# Patient Record
Sex: MALE | Race: BLACK OR AFRICAN AMERICAN | NOT HISPANIC OR LATINO | Employment: FULL TIME | URBAN - METROPOLITAN AREA
[De-identification: names, ages, dates, MRNs, and addresses within clinical notes are randomized per-mention and may not be internally consistent; named-entity substitution may affect disease eponyms.]

---

## 2024-02-09 ENCOUNTER — LAB (OUTPATIENT)
Dept: LAB | Facility: LAB | Age: 24
End: 2024-02-09
Payer: COMMERCIAL

## 2024-02-09 ENCOUNTER — OFFICE VISIT (OUTPATIENT)
Dept: PRIMARY CARE | Facility: CLINIC | Age: 24
End: 2024-02-09
Payer: MEDICAID

## 2024-02-09 VITALS
SYSTOLIC BLOOD PRESSURE: 118 MMHG | WEIGHT: 152.6 LBS | HEIGHT: 74 IN | BODY MASS INDEX: 19.58 KG/M2 | HEART RATE: 94 BPM | OXYGEN SATURATION: 98 % | DIASTOLIC BLOOD PRESSURE: 88 MMHG

## 2024-02-09 DIAGNOSIS — Z11.3 SCREEN FOR STD (SEXUALLY TRANSMITTED DISEASE): Primary | ICD-10-CM

## 2024-02-09 DIAGNOSIS — Z11.3 SCREEN FOR STD (SEXUALLY TRANSMITTED DISEASE): ICD-10-CM

## 2024-02-09 PROCEDURE — 87389 HIV-1 AG W/HIV-1&-2 AB AG IA: CPT

## 2024-02-09 PROCEDURE — 36415 COLL VENOUS BLD VENIPUNCTURE: CPT

## 2024-02-09 PROCEDURE — 86694 HERPES SIMPLEX NES ANTBDY: CPT

## 2024-02-09 PROCEDURE — 87800 DETECT AGNT MULT DNA DIREC: CPT

## 2024-02-09 PROCEDURE — 99213 OFFICE O/P EST LOW 20 MIN: CPT | Performed by: NURSE PRACTITIONER

## 2024-02-09 PROCEDURE — 86780 TREPONEMA PALLIDUM: CPT

## 2024-02-09 PROCEDURE — 86803 HEPATITIS C AB TEST: CPT

## 2024-02-09 PROCEDURE — 1036F TOBACCO NON-USER: CPT | Performed by: NURSE PRACTITIONER

## 2024-02-09 NOTE — PROGRESS NOTES
"Subjective   Patient ID: Duglas Alcala Jr. is a 23 y.o. 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 ).    Was exposed to HSV 2 last week.  Was with a new partner. Had unprotected sex. She was informed last week that she had been exposed to HSV2. She went for testing and was positive.  She had no genital outbreaks and has never.   Patient states he has no symptoms currently.          Review of Systems  otherwise negative aside from what was mentioned above in HPI.    Objective   /88 (BP Location: Left arm, Patient Position: Sitting)   Pulse 94   Ht 1.88 m (6' 2\")   Wt 69.2 kg (152 lb 9.6 oz)   SpO2 98%   BMI 19.59 kg/m²     Physical Exam  Constitutional:       Appearance: Normal appearance.   Cardiovascular:      Rate and Rhythm: Normal rate.   Pulmonary:      Effort: Pulmonary effort is normal.   Abdominal:      General: Abdomen is flat.   Skin:     Capillary Refill: Capillary refill takes less than 2 seconds.   Neurological:      General: No focal deficit present.      Mental Status: He is alert and oriented to person, place, and time. Mental status is at baseline.   Psychiatric:         Mood and Affect: Mood normal.         Behavior: Behavior normal.         Thought Content: Thought content normal.         Judgment: Judgment normal.         Assessment/Plan   Problem List Items Addressed This Visit    None  Visit Diagnoses         Codes    Screen for STD (sexually transmitted disease)    -  Primary Z11.3    Relevant Orders    HSV Type I/II IgM Antibody    C. Trachomatis / N. Gonorrhoeae, Amplified Detection    Syphilis Screen with Reflex    HIV 1/2 Antigen/Antibody Screen with Reflex to Confirmation    Hepatitis C antibody               "

## 2024-02-10 LAB
C TRACH RRNA SPEC QL NAA+PROBE: NEGATIVE
HCV AB SER QL: NONREACTIVE
HIV 1+2 AB+HIV1 P24 AG SERPL QL IA: NONREACTIVE
N GONORRHOEA DNA SPEC QL PROBE+SIG AMP: NEGATIVE
TREPONEMA PALLIDUM IGG+IGM AB [PRESENCE] IN SERUM OR PLASMA BY IMMUNOASSAY: NONREACTIVE

## 2024-02-12 ENCOUNTER — TELEPHONE (OUTPATIENT)
Dept: PRIMARY CARE | Facility: CLINIC | Age: 24
End: 2024-02-12
Payer: COMMERCIAL

## 2024-02-12 LAB — HSV1+2 IGM SER IA-ACNC: 0.46 IV

## 2024-02-12 NOTE — TELEPHONE ENCOUNTER
----- Message from Yumi Salas, APRN-CNP sent at 2/12/2024 10:12 AM EST -----  Can you let him know we are still waiting on herpes test but all other testing was negative including, HIV, syphilis, gonorrhea, chlamydia & Hep C.

## 2024-07-25 ENCOUNTER — APPOINTMENT (OUTPATIENT)
Dept: PRIMARY CARE | Facility: CLINIC | Age: 24
End: 2024-07-25
Payer: COMMERCIAL

## 2024-07-25 VITALS
SYSTOLIC BLOOD PRESSURE: 111 MMHG | HEIGHT: 74 IN | HEART RATE: 89 BPM | BODY MASS INDEX: 19.12 KG/M2 | OXYGEN SATURATION: 98 % | TEMPERATURE: 99.9 F | DIASTOLIC BLOOD PRESSURE: 74 MMHG | WEIGHT: 149 LBS | RESPIRATION RATE: 16 BRPM

## 2024-07-25 DIAGNOSIS — M76.51 PATELLAR TENDINITIS OF RIGHT KNEE: ICD-10-CM

## 2024-07-25 DIAGNOSIS — Z00.00 ROUTINE HEALTH MAINTENANCE: Primary | ICD-10-CM

## 2024-07-25 DIAGNOSIS — N50.89 LUMP IN SCROTUM: ICD-10-CM

## 2024-07-25 PROBLEM — M25.569 KNEE PAIN: Status: ACTIVE | Noted: 2022-04-22

## 2024-07-25 PROBLEM — R29.898 POPPING SOUND OF KNEE JOINT: Status: ACTIVE | Noted: 2024-07-25

## 2024-07-25 PROBLEM — J45.909 ASTHMA (HHS-HCC): Status: ACTIVE | Noted: 2024-07-25

## 2024-07-25 PROBLEM — M79.645 PAIN OF LEFT THUMB: Status: ACTIVE | Noted: 2024-07-25

## 2024-07-25 PROBLEM — F41.9 ANXIETY: Status: ACTIVE | Noted: 2024-07-25

## 2024-07-25 PROBLEM — M76.899 HAMSTRING TENDONITIS AT ORIGIN: Status: ACTIVE | Noted: 2024-07-25

## 2024-07-25 PROBLEM — H40.003 GLAUCOMA SUSPECT OF BOTH EYES: Status: ACTIVE | Noted: 2024-07-25

## 2024-07-25 PROBLEM — S69.90XA: Status: ACTIVE | Noted: 2024-07-25

## 2024-07-25 PROBLEM — S80.02XA CONTUSION OF LEFT KNEE: Status: ACTIVE | Noted: 2024-07-25

## 2024-07-25 PROBLEM — H52.13 BILATERAL MYOPIA: Status: ACTIVE | Noted: 2024-07-25

## 2024-07-25 PROBLEM — Z86.59 HISTORY OF ADHD: Status: ACTIVE | Noted: 2024-07-25

## 2024-07-25 PROBLEM — S63.642A SPRAIN OF METACARPOPHALANGEAL JOINT OF LEFT THUMB: Status: ACTIVE | Noted: 2024-07-25

## 2024-07-25 PROBLEM — R76.11 PPD POSITIVE: Status: ACTIVE | Noted: 2024-07-25

## 2024-07-25 PROBLEM — M21.40 FLAT FOOT: Status: ACTIVE | Noted: 2024-07-25

## 2024-07-25 PROBLEM — R80.9 PROTEINURIA: Status: ACTIVE | Noted: 2024-07-25

## 2024-07-25 PROBLEM — R74.8 ELEVATED ALKALINE PHOSPHATASE LEVEL: Status: ACTIVE | Noted: 2024-07-25

## 2024-07-25 PROCEDURE — 3008F BODY MASS INDEX DOCD: CPT | Performed by: INTERNAL MEDICINE

## 2024-07-25 PROCEDURE — 1036F TOBACCO NON-USER: CPT | Performed by: INTERNAL MEDICINE

## 2024-07-25 PROCEDURE — 99395 PREV VISIT EST AGE 18-39: CPT | Performed by: INTERNAL MEDICINE

## 2024-07-25 ASSESSMENT — ENCOUNTER SYMPTOMS
HEADACHES: 0
DYSURIA: 0
POLYPHAGIA: 0
EYE PAIN: 0
VOMITING: 0
WHEEZING: 0
DIARRHEA: 0
ARTHRALGIAS: 0
SHORTNESS OF BREATH: 0
MYALGIAS: 0
NAUSEA: 0
COUGH: 0
CHEST TIGHTNESS: 0
DYSPHORIC MOOD: 0
CONSTIPATION: 0
CHILLS: 0
POLYDIPSIA: 0
DIZZINESS: 0
BLOOD IN STOOL: 0
WOUND: 0
HEMATURIA: 0
NERVOUS/ANXIOUS: 0
FREQUENCY: 0
UNEXPECTED WEIGHT CHANGE: 0
PALPITATIONS: 0
ABDOMINAL PAIN: 0
SORE THROAT: 0
RHINORRHEA: 0
FEVER: 0

## 2024-07-25 ASSESSMENT — PATIENT HEALTH QUESTIONNAIRE - PHQ9
2. FEELING DOWN, DEPRESSED OR HOPELESS: NOT AT ALL
SUM OF ALL RESPONSES TO PHQ9 QUESTIONS 1 AND 2: 0
1. LITTLE INTEREST OR PLEASURE IN DOING THINGS: NOT AT ALL

## 2024-07-25 NOTE — PROGRESS NOTES
"Subjective   Patient ID: shahnaz Alcala Jr. is a 24 y.o. male who presents for Annual Exam and Joint Pain (Right knee mostly /).    HPI     Dental visits- UTD  Eye visits- no visual issues    Exercise-basketball, running  Diet-    Alcohol use-Rare  Smoking-none  Marijuana only    Lump in scrotum. Has been there for some time. Worried about it. No drainage. Small    Right knee pain/irritation. Same as before. Wants to do PT    Declines STD screen  Wears condom  States no new partners since February when he had screen    Wants to gain weight and states he cannot    Review of Systems   Constitutional:  Negative for chills, fever and unexpected weight change.   HENT:  Negative for congestion, hearing loss, rhinorrhea and sore throat.    Eyes:  Negative for pain and visual disturbance.   Respiratory:  Negative for cough, chest tightness, shortness of breath and wheezing.    Cardiovascular:  Negative for chest pain and palpitations.   Gastrointestinal:  Negative for abdominal pain, blood in stool, constipation, diarrhea, nausea and vomiting.   Endocrine: Negative for cold intolerance, heat intolerance, polydipsia and polyphagia.   Genitourinary:  Negative for dysuria, frequency and hematuria.   Musculoskeletal:  Negative for arthralgias and myalgias.   Skin:  Negative for rash and wound.   Neurological:  Negative for dizziness, syncope and headaches.   Psychiatric/Behavioral:  Negative for dysphoric mood. The patient is not nervous/anxious.        Objective   /74 (BP Location: Left arm, Patient Position: Sitting, BP Cuff Size: Adult)   Pulse 89   Temp 37.7 °C (99.9 °F)   Resp 16   Ht 1.88 m (6' 2\")   Wt 67.6 kg (149 lb)   SpO2 98%   BMI 19.13 kg/m²     Physical Exam  Vitals reviewed. Exam conducted with a chaperone present (Re present during  exam).   Constitutional:       Appearance: Normal appearance. He is not ill-appearing.   HENT:      Head: Normocephalic and atraumatic.      Right Ear: Tympanic " membrane normal.      Left Ear: Tympanic membrane normal.      Nose: Nose normal.      Mouth/Throat:      Mouth: Mucous membranes are dry.      Pharynx: Oropharynx is clear.   Eyes:      Extraocular Movements: Extraocular movements intact.      Conjunctiva/sclera: Conjunctivae normal.      Pupils: Pupils are equal, round, and reactive to light.   Cardiovascular:      Rate and Rhythm: Normal rate and regular rhythm.   Pulmonary:      Effort: Pulmonary effort is normal.      Breath sounds: Normal breath sounds. No wheezing.   Abdominal:      General: There is no distension.      Palpations: Abdomen is soft. There is no mass.      Tenderness: There is no abdominal tenderness.   Genitourinary:     Testes: Normal.      Comments: Right sided small cyst along epididymis -pea sized  Musculoskeletal:         General: No swelling.      Cervical back: Neck supple.   Lymphadenopathy:      Cervical: No cervical adenopathy.   Neurological:      General: No focal deficit present.      Mental Status: He is alert and oriented to person, place, and time.      Gait: Gait normal.   Psychiatric:         Mood and Affect: Mood normal.         Behavior: Behavior normal.         Thought Content: Thought content normal.         Assessment/Plan   Problem List Items Addressed This Visit             ICD-10-CM    Patellar tendinitis of right knee M76.51    Relevant Orders    Referral to Physical Therapy     Other Visit Diagnoses         Codes    Routine health maintenance    -  Primary Z00.00    Relevant Orders    CBC    Comprehensive Metabolic Panel    TSH with reflex to Free T4 if abnormal    Urinalysis with Reflex Microscopic    Lump in scrotum     N50.89    Relevant Orders    US scrotum        CPE completed.  Advised to keep a heart healthy, low fat  diet with fruits and veggies like Mediterranean diet.  Advised on the importance of exercise and maintaining 150 minutes of exercise per week (30 minutes per day 5 days a week).  Advised on  regular eye and dental visits.  Discussed age appropriate cancer screening, immunizations and recommendations given.  Discussed avoiding illicit drugs and tobacco. Advised on appropriate use of alcohol.  Advised to wear seat belt.    Knee pain-send to PT    Lump in scrotum- appears like cyst  -check ultrasound    UTD Td

## 2025-07-25 ENCOUNTER — APPOINTMENT (OUTPATIENT)
Dept: PRIMARY CARE | Facility: CLINIC | Age: 25
End: 2025-07-25
Payer: COMMERCIAL